# Patient Record
Sex: FEMALE | Race: WHITE
[De-identification: names, ages, dates, MRNs, and addresses within clinical notes are randomized per-mention and may not be internally consistent; named-entity substitution may affect disease eponyms.]

---

## 2018-07-03 ENCOUNTER — HOSPITAL ENCOUNTER (EMERGENCY)
Dept: HOSPITAL 47 - EC | Age: 58
Discharge: HOME | End: 2018-07-03
Payer: COMMERCIAL

## 2018-07-03 VITALS — SYSTOLIC BLOOD PRESSURE: 150 MMHG | TEMPERATURE: 97.6 F | HEART RATE: 76 BPM | DIASTOLIC BLOOD PRESSURE: 69 MMHG

## 2018-07-03 VITALS — RESPIRATION RATE: 18 BRPM

## 2018-07-03 DIAGNOSIS — J45.909: ICD-10-CM
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
## 2018-07-03 DIAGNOSIS — M47.812: ICD-10-CM

## 2018-07-03 DIAGNOSIS — Z79.899: ICD-10-CM

## 2018-07-03 DIAGNOSIS — S80.812A: ICD-10-CM

## 2018-07-03 DIAGNOSIS — Z88.0: ICD-10-CM

## 2018-07-03 DIAGNOSIS — S82.55XA: Primary | ICD-10-CM

## 2018-07-03 DIAGNOSIS — R07.81: ICD-10-CM

## 2018-07-03 DIAGNOSIS — G25.81: ICD-10-CM

## 2018-07-03 DIAGNOSIS — S80.212A: ICD-10-CM

## 2018-07-03 DIAGNOSIS — Z88.2: ICD-10-CM

## 2018-07-03 PROCEDURE — 29515 APPLICATION SHORT LEG SPLINT: CPT

## 2018-07-03 PROCEDURE — 72050 X-RAY EXAM NECK SPINE 4/5VWS: CPT

## 2018-07-03 PROCEDURE — 71046 X-RAY EXAM CHEST 2 VIEWS: CPT

## 2018-07-03 PROCEDURE — 99284 EMERGENCY DEPT VISIT MOD MDM: CPT

## 2018-07-03 NOTE — XR
EXAMINATION TYPE: XR cervical spine comp

 

DATE OF EXAM: 7/3/2018

 

COMPARISON: NONE

 

HISTORY: Pain

 

TECHNIQUE: Four views are submitted.

 

FINDINGS: 

The odontoid is intact.  There are no compression deformities.  The prevertebral soft tissue structur
es are within normal limits.  There is severe multilevel degenerative disc disease and posterior spon
dylosis with retrolisthesis of C4 on 5 and C5 and C6. Multilevel facet arthropathy noted. There is fo
raminal encroachment level C4-C7.

 

IMPRESSION:

1. Multilevel degenerative disc disease and cervical spondylosis with multilevel foraminal encroachme
nt.

## 2018-07-03 NOTE — XR
EXAMINATION TYPE: XR knee limited LT

 

DATE OF EXAM: 7/3/2018

 

COMPARISON: NONE

 

HISTORY: Pain

 

TECHNIQUE:

Three views are submitted.

 

FINDINGS:

Joint spaces are preserved.  Osseous structures are intact.  No acute fracture seen.  

 

IMPRESSION:

1. No acute fracture or dislocation.
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
## 2018-07-03 NOTE — XR
EXAMINATION TYPE: XR chest 2V

 

DATE OF EXAM: 7/3/2018

 

COMPARISON: NONE

 

TECHNIQUE: PA and lateral views submitted.

 

HISTORY: Pain

 

FINDINGS:

The lungs are clear and  there is no pneumothorax, pleural effusion, or focal pneumonia.  Biapical pl
eural thickening. Hyperinflation suggests COPD. Hypertrophic change of the spine noted. No overt fail
ure.

 

IMPRESSION: 

1. No acute process.

## 2018-07-03 NOTE — XR
EXAMINATION TYPE: XR ankle complete LT

 

DATE OF EXAM: 7/3/2018

 

COMPARISON: NONE

 

HISTORY: Pain

 

FINDINGS:

Three views of the ankle demonstrate the ankle mortise to be intact and symmetric.   Soft tissue swel
ling. There appears to be a linear lucency along the medial malleolus compatible with an acute fractu
re. This extends to the articular surface.

 

IMPRESSION:

1. Acute fracture or mild displacement medial malleolus extending to the articular surface.

## 2018-07-06 ENCOUNTER — HOSPITAL ENCOUNTER (OUTPATIENT)
Dept: HOSPITAL 47 - RADCTMAIN | Age: 58
Discharge: HOME | End: 2018-07-06
Payer: COMMERCIAL

## 2018-07-06 DIAGNOSIS — M85.672: ICD-10-CM

## 2018-07-06 DIAGNOSIS — M19.072: ICD-10-CM

## 2018-07-06 DIAGNOSIS — S82.52XA: Primary | ICD-10-CM

## 2018-07-06 DIAGNOSIS — M67.874: ICD-10-CM

## 2018-07-08 NOTE — CT
EXAMINATION TYPE: CT ankle LT wo con

 

DATE OF EXAM: 7/6/2018

 

COMPARISON: 7/3/2018

 

HISTORY: Medial malleolus fx.

 

CT DLP: 177.3 mGycm

Automated exposure control for dose reduction was used.

 

TECHNIQUE: Contiguous axial slices were obtained through the left ankle without intravenous contrast.
 Coronal and sagittal reformats were obtained for review.

 

FINDINGS: 

There is redemonstration of a medial malleolus fracture with a proximally 1 mm anterior displacement 
of the distal fracture fragment with extent into the anterior medial joint space and extensive overly
ing soft tissue swelling. No lateral malleolus fracture or posterior malleolus fracture is seen. Subc
hondral cystic change is noted of the distal tibia and of the talar dome most pronounced of the media
l talar dome. Talar dome maintains a normal morphology without collapse. No fracture of the talus or 
calcaneus is seen. No additional fracture seen of the left ankle. Evaluation of the soft tissue struc
tures is limited on CT and further limited by the extensive surrounding edema. Grossly the Achilles t
endon and extensor tendons appear intact in their visualized portions with somewhat thickening of the
 lateral compartment tendons. Soft tissue swelling is generalized but most pronounced over the medial
 malleolus. Evaluation of the anterior talofibular ligament and posterior talofibular ligament are li
mited on CT. Soft tissue swelling is seen in the region of the deltoid ligament. Small probable acces
terri ossicle is noted at the posterior aspect of the navicular versus much less likely radiopaque for
eign body on series 3 image 52.

 

IMPRESSION: 

1. REDEMONSTRATION OF A VERY MINIMALLY DISPLACED INTRA-ARTICULAR MEDIAL MALLEOLUS FRACTURE WITH NO AD
DITIONAL FRACTURE IDENTIFIED. EXTENSIVE OVERLYING SOFT TISSUE SWELLING LIMITS EVALUATION OF THE LIGAM
ENTS AND TENDONS.

2. NO ADDITIONAL FRACTURE OF THE LEFT ANKLE IS SEEN HOWEVER THERE IS A 3 MM FRAGMENT NEAR THE NAVICUL
AR THAT LIKELY RELATES TO AN ACCESSORY OSSICLE OR VERY LESS LIKELY RADIOPAQUE FOREIGN BODY.

3. DISTAL TENDINOUS THICKENING OF THE LATERAL FLEXOR COMPARTMENT. TENDONS AND LIGAMENTS COULD BE FURT
HER EVALUATED FOR TEAR AFTER SWALLOWING IMPROVES WITH MRI. ADDITIONALLY FOCAL SOFT TISSUE SWELLING IS
 SEEN IN THE REGION OF THE DELTOID LIGAMENT, LIMITING EVALUATION.

4. SUBCHONDRAL CYSTIC CHANGE OF THE DISTAL TIBIA AND TALAR DOME FROM MODERATE HINDFOOT ARTHROPATHY.

## 2018-07-17 ENCOUNTER — HOSPITAL ENCOUNTER (OUTPATIENT)
Dept: HOSPITAL 47 - RADXRYALE | Age: 58
Discharge: HOME | End: 2018-07-17
Attending: PHYSICIAN ASSISTANT
Payer: COMMERCIAL

## 2018-07-17 DIAGNOSIS — S22.41XA: Primary | ICD-10-CM
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
## 2019-06-10 ENCOUNTER — HOSPITAL ENCOUNTER (OUTPATIENT)
Dept: HOSPITAL 47 - RADMAMWWP | Age: 59
Discharge: HOME | End: 2019-06-10
Payer: COMMERCIAL

## 2019-06-10 DIAGNOSIS — M85.80: ICD-10-CM

## 2019-06-10 DIAGNOSIS — Z12.31: Primary | ICD-10-CM

## 2019-06-10 DIAGNOSIS — N95.1: ICD-10-CM

## 2019-06-10 PROCEDURE — 77080 DXA BONE DENSITY AXIAL: CPT

## 2019-06-10 PROCEDURE — 77067 SCR MAMMO BI INCL CAD: CPT
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
## 2019-06-12 NOTE — MM
Reason for exam: screening  (asymptomatic).

Last mammogram was performed 2 years and 7 months ago.



History:

Patient is postmenopausal, history of other cancer, and had first child at age 32.

Retro-pectoral implants, 2013.

Taking estrogen for 1 year.



Physical Findings:

A clinical breast exam by your physician is recommended on an annual basis and 

results should be correlated with mammographic findings.



MG Screening Mammo Implant/CAD

Bilateral CC, MLO, and ID view(s) were taken.

Prior study comparison: November 18, 2016, bilateral MG screening mammo 

implant/CAD.  December 29, 2014, left breast MG work up mamm w CAD LT.

The breast tissue is heterogeneously dense. This may lower the sensitivity of 

mammography.  Bilateral subpectoral implants.  No discrete abnormality.





ASSESSMENT: Benign, BI-RAD 2



RECOMMENDATION:

Routine screening mammogram of both breasts.

## 2020-08-18 ENCOUNTER — HOSPITAL ENCOUNTER (OUTPATIENT)
Dept: HOSPITAL 47 - RADMAMWWP | Age: 60
Discharge: HOME | End: 2020-08-18
Attending: FAMILY MEDICINE
Payer: COMMERCIAL

## 2020-08-18 DIAGNOSIS — Z12.39: Primary | ICD-10-CM

## 2020-08-18 PROCEDURE — 77067 SCR MAMMO BI INCL CAD: CPT

## 2020-08-18 NOTE — MM
Reason for exam: screening  (asymptomatic).

Last mammogram was performed 1 year and 2 months ago.



History:

Patient is postmenopausal, history of other cancer, and had first child at age 32.

Retro-pectoral implants, 2013.

Taking estrogen for 5 years.



Physical Findings:

A clinical breast exam by your physician is recommended on an annual basis and 

results should be correlated with mammographic findings.



MG Screening Mammo Implant/CAD

Bilateral CC, MLO, and ID view(s) were taken.

Prior study comparison: Dahlia 10, 2019, bilateral MG screening mammo implant/CAD.  

November 18, 2016, bilateral MG screening mammo implant/CAD.

The breast tissue is heterogeneously dense. This may lower the sensitivity of 

mammography.  There is no discrete abnormality. Bilateral subpectoral implants 

redemonstrated.





ASSESSMENT: Benign, BI-RAD 2



RECOMMENDATION:

Routine screening mammogram of both breasts in 1 year.

## 2023-06-26 ENCOUNTER — HOSPITAL ENCOUNTER (OUTPATIENT)
Dept: HOSPITAL 47 - RADMAMWWP | Age: 63
Discharge: HOME | End: 2023-06-26
Attending: FAMILY MEDICINE
Payer: COMMERCIAL

## 2023-06-26 DIAGNOSIS — Z12.31: Primary | ICD-10-CM

## 2023-06-26 DIAGNOSIS — Z78.0: ICD-10-CM

## 2023-06-26 PROCEDURE — 77067 SCR MAMMO BI INCL CAD: CPT

## 2023-06-27 NOTE — MM
Reason for Exam: Screening  (asymptomatic). 

Last mammogram was performed 1 year(s) and 3 month(s) ago. 





Patient History: 

Menarche at age 15. First Full-Term Pregnancy at age 32. Late child-bearing (after 31).

Postmenopausal. Other cancer. Currently using Estrogen, for 6 years, 7 months. 2013, Implant(s). 





Risk Values: 

Evelyne 5 year model risk: 1.9%.

NCI Lifetime model risk: 8.9%.





Prior Study Comparison: 

6/10/2019 Bilateral Screening Mammogram, [facility]. 8/18/2020 Bilateral Screening Mammogram, [facility].

3/11/2022 Bilateral Screening Mammogram, [facility]. 





Tissue Density: 

The breast tissue is heterogeneously dense. This may lower the sensitivity of mammography.





Findings: 

Analyzed By CAD. 

Bilateral breast implants. There is no suspicious group of microcalcifications or new suspicious

mass in either breast. 





Overall Assessment: Benign, BI-RAD 2





Management: 

Screening Mammogram of both breasts in 1 year.

[facility] will attempt to contact patient to return for supplemental views and

ultrasound if indicated.



Patient should continue monthly self-breast exams.  A clinical breast exam by your physician is

recommended on an annual basis.

This exam should not preclude additional follow-up of suspicious palpable abnormalities.



Note on Evelyne scores and lifetime risk:

1. A Evelyne score greater than 3% is considered moderate risk. If this is the case, consider

specialist referral to assess eligibility for a risk reducing agent.

2. If overall lifetime risk for the development of breast cancer is 20% or higher, the patient may

qualify for future screening with alternating mammogram and breast MRI.



Electronically signed and approved by: Isidro Friend DO

## 2025-01-14 ENCOUNTER — HOSPITAL ENCOUNTER (OUTPATIENT)
Dept: HOSPITAL 47 - RADMAMWWP | Age: 65
Discharge: HOME | End: 2025-01-14
Attending: OBSTETRICS & GYNECOLOGY
Payer: COMMERCIAL

## 2025-01-14 DIAGNOSIS — R92.333: ICD-10-CM

## 2025-01-14 DIAGNOSIS — Z12.31: Primary | ICD-10-CM

## 2025-01-14 DIAGNOSIS — Z78.0: ICD-10-CM

## 2025-01-14 DIAGNOSIS — Z98.82: ICD-10-CM

## 2025-01-14 PROCEDURE — 77067 SCR MAMMO BI INCL CAD: CPT

## 2025-01-14 NOTE — MM
Reason for Exam: Screening  (asymptomatic). 

Last mammogram was performed 1 year(s) and 7 month(s) ago. 





Patient History: 

Menarche at age 15. First Full-Term Pregnancy at age 32. Late child-bearing (after 31).

Postmenopausal. Other cancer. Currently using Estrogen, for 6 years, 7 months. 2013, Implant(s). 





Risk Values: 

Evelyne 5 year model risk: 2.0%.

NCI Lifetime model risk: 8.4%.





Prior Study Comparison: 

8/18/2020 Bilateral Screening Mammogram, [facility]. 3/11/2022 Bilateral Screening Mammogram, [facility].

6/26/2023 Bilateral MG screening mammo implant/CAD, [facility]. 





Tissue Density: 

The breasts are heterogeneously dense, which may obscure small masses.





Findings: 

Analyzed By CAD. 

Subpectoral bilateral breast implants are redemonstrated.



There is no suspicious new group of microcalcifications or new suspicious mass in either breast. 





Overall Assessment: Benign, BI-RAD 2





Management: 

Screening Mammogram of both breasts in 1 year.

.



Patient should continue monthly self-breast exams.  A clinical breast exam by your physician is

recommended on an annual basis.

This exam should not preclude additional follow-up of suspicious palpable abnormalities.



Note on Evelyne scores and lifetime risk:

1. A Evelyne score greater than 3% is considered moderate risk. If this is the case, consider

specialist referral to assess eligibility for a risk reducing agent.

2. If overall lifetime risk for the development of breast cancer is 20% or higher, the patient may

qualify for future screening with alternating mammogram and breast MRI.









[facility], Workstation: [id number], 1/14/2025 10:41 AM.



Electronically signed and approved by: Ethan Brandon M.D.

## 2025-03-29 ENCOUNTER — HOSPITAL ENCOUNTER (OUTPATIENT)
Dept: HOSPITAL 47 - RADMRIMAIN | Age: 65
Discharge: HOME | End: 2025-03-29
Attending: OBSTETRICS & GYNECOLOGY
Payer: COMMERCIAL

## 2025-03-29 DIAGNOSIS — K76.89: ICD-10-CM

## 2025-03-29 DIAGNOSIS — D25.1: ICD-10-CM

## 2025-03-29 DIAGNOSIS — N28.1: Primary | ICD-10-CM

## 2025-03-29 PROCEDURE — 72197 MRI PELVIS W/O & W/DYE: CPT

## 2025-04-08 NOTE — MR
EXAMINATION TYPE: MR pelvis wo/w con

 

DATE OF EXAM: 3/29/2025

 

COMPARISON: Ultrasound transvaginal 12/19/2016, 11/9/2015, outside institution pelvic ultrasound repo
rt 3/6/2025

 

CLINICAL INDICATION:Female, 64 years old with history of R19.09 RIGHT UPPER QUADRANT ABDOMINAL SWELLI
NICOLAS REDD; [facility],

 

 

TECHNIQUE: Triplane  multisequence imaging was performed of the pelvis.  Then the patient was given c
ontrast/gadolinium, 6 cc of Gadobutrol and multiple post contrast sequences where obtained.  

 

FINDINGS: 

Reproductive: 

Vagina: Unremarkable. 

Uterus: The uterus is anteverted in position. Uterus measures 8.0 x 3.8 x 4.4 cm. The endometrium and
 junctional zone are within normal limits. The endometrium measures 5 mm in thickness. There is an in
tramural T1/T2 hypointense lesion within the posterior uterine body measuring 1.5 x 1.1 cm (series 10
01, image 21). Additional intramural T1/T2 hypointense lesion within the uterine fundus on the right 
measuring up to 0.8 cm (series 1001, image 19). No internal enhancement within these lesions.  Multip
le nabothian cysts are seen in the lower uterine segment.  Heterogenous appearance of the cervix.

Ovaries: Unremarkable appearance of the left ovary. Right ovary demonstrates a central follicle. No s
uspicious enhancing ovarian lesion identified. No T1 hyperintense ovarian lesions. 

 

Bladder: Unremarkable.

Bowel: Unremarkable as visualized.  

Peritoneum: No free fluid.    No evidence of adenopathy.

Vasculature: Prominent vasculature within the left adnexa.  

Abdominal wall/soft tissues: Unremarkable.

Musculoskeletal: Multilevel degenerative disc disease. Incidental sacral Tarlov cysts.

Other: Multiple T2 hyperintense thin-walled cysts identified within both hepatic lobes. Largest measu
rement of 3.7 cm. Few left thin-walled T2 hyperintense renal sinus cysts. Largest measures up to 3.0 
cm.

 

IMPRESSION:

1. No evidence of suspicious ovarian mass.

 

2. Couple of intramural uterine fibroids.

 

3. Multiple hepatic and left renal simple appearing cysts.

 

4. Prominent vasculature within the left adnexa which can be seen with pelvic congestion syndrome. Co
rrelate clinically.

 

[facility], Workstation: [id number], 4/8/2025 3:21 PM